# Patient Record
Sex: FEMALE | Race: WHITE | NOT HISPANIC OR LATINO | Employment: UNEMPLOYED | URBAN - METROPOLITAN AREA
[De-identification: names, ages, dates, MRNs, and addresses within clinical notes are randomized per-mention and may not be internally consistent; named-entity substitution may affect disease eponyms.]

---
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
## 2024-12-11 ENCOUNTER — APPOINTMENT (OUTPATIENT)
Dept: PEDIATRICS | Facility: CLINIC | Age: 19
End: 2024-12-11
Payer: COMMERCIAL

## 2024-12-11 VITALS
SYSTOLIC BLOOD PRESSURE: 124 MMHG | WEIGHT: 135.13 LBS | HEIGHT: 63 IN | BODY MASS INDEX: 23.94 KG/M2 | HEART RATE: 84 BPM | DIASTOLIC BLOOD PRESSURE: 76 MMHG

## 2024-12-11 DIAGNOSIS — N91.3 PRIMARY OLIGOMENORRHEA: ICD-10-CM

## 2024-12-11 DIAGNOSIS — Z00.00 WELLNESS EXAMINATION: Primary | ICD-10-CM

## 2024-12-11 PROBLEM — Z78.9 GLUTEN FREE DIET: Status: ACTIVE | Noted: 2024-12-11

## 2024-12-11 PROBLEM — L67.9 HAIR CHANGES: Status: RESOLVED | Noted: 2023-09-08 | Resolved: 2024-12-11

## 2024-12-11 LAB — PREGNANCY TEST URINE, POC: NEGATIVE

## 2024-12-11 PROCEDURE — 81025 URINE PREGNANCY TEST: CPT | Performed by: PEDIATRICS

## 2024-12-11 PROCEDURE — 99395 PREV VISIT EST AGE 18-39: CPT | Performed by: PEDIATRICS

## 2024-12-11 PROCEDURE — 1036F TOBACCO NON-USER: CPT | Performed by: PEDIATRICS

## 2024-12-11 PROCEDURE — 96127 BRIEF EMOTIONAL/BEHAV ASSMT: CPT | Performed by: PEDIATRICS

## 2024-12-11 PROCEDURE — 3008F BODY MASS INDEX DOCD: CPT | Performed by: PEDIATRICS

## 2024-12-11 RX ORDER — NORETHINDRONE ACETATE AND ETHINYL ESTRADIOL 1MG-20(21)
1 KIT ORAL DAILY
Qty: 84 TABLET | Refills: 3 | Status: SHIPPED | OUTPATIENT
Start: 2024-12-11

## 2024-12-11 NOTE — PROGRESS NOTES
"Subjective   History was provided by the  patient .  Collette Mercer is a 18 y.o. female who is here for this well-child visit.    Current Issues:  Current concerns include: none.  Vision or hearing concerns? no  Dental care up to date? Yes- brushes teeth 2 times/day , regular dental visits , does floss teeth     Review of Nutrition, Elimination, and Sleep:  Current diet:  gluten free -  3 meals/day , normal portions , fast food <1 time per week , <8oz. sugar containing beverages daily , appropriate dairy intake , appropriate fruits, vegetables, and protein intake  Balanced diet? 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  Behavior: socializes well with peers , responds well to discipline (privilege restrictions)    Sports Participation Screening:  Gets regular exercise , participates in no sports    Screening Questions:  Other: normal mood , denies suicidal ideations , satisfied with body weight    Risk factors for sexually-transmitted infections:   Sexually active: no     Risk factors for alcohol/drug use:  no  Smoking - No  Vaping - No  Drinking - Yes occ at school  Genitourinary: aware of pubertal changes      +  menarche ,normal menses - no issues    Objective   /76 (BP Location: Right arm, Patient Position: Sitting) Comment: Pts bp re-checked 2 times/nervous per pt  Pulse 84   Ht 1.594 m (5' 2.75\") Comment: Pts height re-checked 2 times  Wt 61.3 kg (135 lb 2 oz)   BMI 24.13 kg/m²   Growth parameters are noted and are appropriate for age.    Physical Exam  Constitutional:       Appearance: Normal appearance.   HENT:      Right Ear: Tympanic membrane normal.      Left Ear: Tympanic membrane normal.      Nose: Nose normal.      Mouth/Throat:      Mouth: Mucous membranes are moist.      " Pharynx: Oropharynx is clear.   Eyes:      Extraocular Movements: Extraocular movements intact.   Cardiovascular:      Rate and Rhythm: Normal rate and regular rhythm.      Pulses:           Femoral pulses are 2+ on the right side and 2+ on the left side.     Heart sounds: No murmur heard.  Pulmonary:      Effort: Pulmonary effort is normal.      Breath sounds: Normal breath sounds.   Chest:   Breasts:     Ephraim Score is 5.      Breasts are symmetrical.   Abdominal:      General: Abdomen is flat.      Palpations: Abdomen is soft. There is no hepatomegaly, splenomegaly or mass.   Musculoskeletal:         General: Normal range of motion.      Cervical back: Normal range of motion and neck supple.      Thoracic back: No scoliosis.      Lumbar back: No scoliosis.   Lymphadenopathy:      Cervical: No cervical adenopathy.   Skin:     General: Skin is warm.   Neurological:      General: No focal deficit present.      Mental Status: She is alert.      Deep Tendon Reflexes:      Reflex Scores:       Patellar reflexes are 2+ on the right side and 2+ on the left side.  Psychiatric:         Mood and Affect: Mood normal.         Behavior: Behavior normal.       Assessment/Plan   Diagnoses and all orders for this visit:  Wellness examination  Primary oligomenorrhea  -     POCT pregnancy, urine manually resulted  -     norethindrone-e.estradioL-iron (Junel FE 1/20, 28,) 1 mg-20 mcg (21)/75 mg (7) tablet; Take 1 tablet by mouth once daily.  Other orders  -     1 Year Follow Up In Pediatrics; Future  Well adolescent.  - Anticipatory guidance discussed.   - Injury prevention: wearing seatbelt , understanding sun protection , understanding conflict resolution/violence prevention,  reviewed driving safety    -Risk Taking: cardiac risk factors reviewed , alcohol, drug and tobacco use reviewed , reviewed internet safety      -  Growth and weight gain appropriate. The patient was counseled regarding nutrition and physical activity.  -  Development: appropriate for age  -Immunizations today: per orders. All vaccines given at today’s visit were reviewed with the family. Risks/benefits/side effects discussed and VIS sheet provided. All questions answered. Given with consent   -Cleared for school/sports  - Follow up in 1 year for next well child exam or sooner with concerns.      Problem List Items Addressed This Visit       Oligomenorrhea    Relevant Medications    norethindrone-e.estradioL-iron (Junel FE 1/20, 28,) 1 mg-20 mcg (21)/75 mg (7) tablet    Other Relevant Orders    POCT pregnancy, urine manually resulted (Completed)     Other Visit Diagnoses       Wellness examination    -  Primary